# Patient Record
Sex: FEMALE | Race: WHITE
[De-identification: names, ages, dates, MRNs, and addresses within clinical notes are randomized per-mention and may not be internally consistent; named-entity substitution may affect disease eponyms.]

---

## 2018-10-03 ENCOUNTER — HOSPITAL ENCOUNTER (EMERGENCY)
Dept: HOSPITAL 84 - D.ER | Age: 33
Discharge: HOME | End: 2018-10-03
Payer: MEDICAID

## 2018-10-03 VITALS — HEIGHT: 68 IN | WEIGHT: 130.27 LBS | BODY MASS INDEX: 19.74 KG/M2

## 2018-10-03 VITALS — DIASTOLIC BLOOD PRESSURE: 98 MMHG | SYSTOLIC BLOOD PRESSURE: 145 MMHG
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
## 2018-10-03 DIAGNOSIS — F17.200: ICD-10-CM
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
## 2018-10-03 DIAGNOSIS — R60.0: ICD-10-CM

## 2018-10-03 DIAGNOSIS — R51: ICD-10-CM

## 2018-10-03 DIAGNOSIS — S16.1XXA: Primary | ICD-10-CM

## 2018-10-03 DIAGNOSIS — S89.91XA: ICD-10-CM

## 2021-03-18 ENCOUNTER — HOSPITAL ENCOUNTER (OUTPATIENT)
Dept: HOSPITAL 84 - D.LDO | Age: 36
LOS: 1 days | Discharge: HOME | End: 2021-03-19
Attending: OBSTETRICS & GYNECOLOGY
Payer: MEDICAID

## 2021-03-18 VITALS — BODY MASS INDEX: 19.8 KG/M2

## 2021-03-18 DIAGNOSIS — O16.9: Primary | ICD-10-CM

## 2021-03-18 LAB
ALBUMIN SERPL-MCNC: 2.4 G/DL (ref 3.4–5)
ALP SERPL-CCNC: 87 U/L (ref 30–120)
ALT SERPL-CCNC: 22 U/L (ref 10–68)
AMORPHOUS SEDIMENT: (no result) LPF
AMPHETAMINES UR QL SCN: POSITIVE QUAL
ANION GAP SERPL CALC-SCNC: 7.6 MMOL/L (ref 8–16)
BACTERIA #/AREA URNS HPF: (no result) HPF
BARBITURATES UR QL SCN: NEGATIVE QUAL
BASOPHILS NFR BLD AUTO: 0.2 % (ref 0–2)
BENZODIAZ UR QL SCN: NEGATIVE QUAL
BILIRUB DIRECT SERPL-MCNC: 0.09 MG/DL (ref 0–0.3)
BILIRUB INDIRECT SERPL-MCNC: 0.3 MG/DL (ref 0–1)
BILIRUB SERPL-MCNC: 0.39 MG/DL (ref 0.2–1.3)
BILIRUB SERPL-MCNC: NEGATIVE MG/DL
BUN SERPL-MCNC: 9 MG/DL (ref 7–18)
BZE UR QL SCN: NEGATIVE QUAL
CALCIUM SERPL-MCNC: 8.5 MG/DL (ref 8.5–10.1)
CANNABINOIDS UR QL SCN: NEGATIVE QUAL
CHLORIDE SERPL-SCNC: 105 MMOL/L (ref 98–107)
CO2 SERPL-SCNC: 28.9 MMOL/L (ref 21–32)
CREAT SERPL-MCNC: 0.7 MG/DL (ref 0.6–1.3)
EOSINOPHIL NFR BLD: 0.5 % (ref 0–7)
ERYTHROCYTE [DISTWIDTH] IN BLOOD BY AUTOMATED COUNT: 13.2 % (ref 11.5–14.5)
GLOBULIN SER-MCNC: 3.6 G/L
GLUCOSE SERPL-MCNC: 82 MG/DL (ref 74–106)
HCT VFR BLD CALC: 35.9 % (ref 36–48)
HGB BLD-MCNC: 12.1 G/DL (ref 12–16)
IMM GRANULOCYTES NFR BLD: 0.3 % (ref 0–5)
KETONES UR STRIP-MCNC: NEGATIVE MG/DL
LYMPHOCYTES # BLD: 1.79 10X3/UL (ref 1.18–3.74)
LYMPHOCYTES NFR BLD AUTO: 15.9 % (ref 15–50)
MCH RBC QN AUTO: 29.6 PG (ref 26–34)
MCHC RBC AUTO-ENTMCNC: 33.7 G/DL (ref 31–37)
MCV RBC: 87.8 FL (ref 80–100)
MONOCYTES NFR BLD: 5.4 % (ref 2–11)
NEUTROPHILS # BLD: 8.74 10X3/UL (ref 1.56–6.13)
NEUTROPHILS NFR BLD AUTO: 77.7 % (ref 40–80)
NITRITE UR-MCNC: NEGATIVE MG/ML
OPIATES UR QL SCN: NEGATIVE QUAL
OSMOLALITY SERPL CALC.SUM OF ELEC: 273 MOSM/KG (ref 275–300)
PCP UR QL SCN: NEGATIVE QUAL
PH UR STRIP: 5 [PH] (ref 5–8)
PLATELET # BLD: 361 10X3/UL (ref 130–400)
PMV BLD AUTO: 8.8 FL (ref 7.4–10.4)
POTASSIUM SERPL-SCNC: 3.5 MMOL/L (ref 3.5–5.1)
PROT SERPL-MCNC: 6 G/DL (ref 6.4–8.2)
RBC # BLD AUTO: 4.09 10X6/UL (ref 4–5.4)
SODIUM SERPL-SCNC: 138 MMOL/L (ref 136–145)
SQUAMOUS #/AREA URNS HPF: (no result) HPF (ref 0–4)
URATE SERPL-MCNC: 1.6 MG/DL (ref 2.6–7.2)
UROBILINOGEN UR-MCNC: 1 MG/DL (ref ?–2)
WBC # BLD AUTO: 11.3 10X3/UL (ref 4.8–10.8)
WBC #/AREA URNS HPF: (no result) HPF (ref 0–4)

## 2021-03-19 LAB
HCV AB S/CO SERPL IA: <0.1 S/CO RAT (ref 0–0.9)
PROT 24H UR-MRATE: 256 MG/24HR (ref 0–149.1)
PROT UR-MCNC: 23.3 MG/DL (ref 0–11.9)
RUBV IGG SERPL IA-ACNC: <0.9 INDEX

## 2021-03-24 ENCOUNTER — HOSPITAL ENCOUNTER (OUTPATIENT)
Dept: HOSPITAL 84 - D.LDO | Age: 36
Discharge: HOME | End: 2021-03-24
Attending: OBSTETRICS & GYNECOLOGY
Payer: MEDICAID

## 2021-03-24 VITALS — BODY MASS INDEX: 19.8 KG/M2

## 2021-03-24 DIAGNOSIS — O10.919: Primary | ICD-10-CM

## 2021-04-20 ENCOUNTER — HOSPITAL ENCOUNTER (INPATIENT)
Dept: HOSPITAL 84 - D.LD | Age: 36
LOS: 1 days | Discharge: HOME | End: 2021-04-21
Attending: OBSTETRICS & GYNECOLOGY | Admitting: OBSTETRICS & GYNECOLOGY
Payer: MEDICAID

## 2021-04-20 VITALS — HEIGHT: 68 IN | BODY MASS INDEX: 22.58 KG/M2 | WEIGHT: 149 LBS

## 2021-04-20 VITALS — DIASTOLIC BLOOD PRESSURE: 63 MMHG | SYSTOLIC BLOOD PRESSURE: 107 MMHG

## 2021-04-20 VITALS — SYSTOLIC BLOOD PRESSURE: 130 MMHG | DIASTOLIC BLOOD PRESSURE: 79 MMHG

## 2021-04-20 VITALS — DIASTOLIC BLOOD PRESSURE: 85 MMHG | SYSTOLIC BLOOD PRESSURE: 122 MMHG

## 2021-04-20 VITALS — DIASTOLIC BLOOD PRESSURE: 95 MMHG | SYSTOLIC BLOOD PRESSURE: 171 MMHG

## 2021-04-20 VITALS — DIASTOLIC BLOOD PRESSURE: 91 MMHG | SYSTOLIC BLOOD PRESSURE: 143 MMHG

## 2021-04-20 VITALS — SYSTOLIC BLOOD PRESSURE: 134 MMHG | DIASTOLIC BLOOD PRESSURE: 78 MMHG

## 2021-04-20 VITALS — DIASTOLIC BLOOD PRESSURE: 68 MMHG | SYSTOLIC BLOOD PRESSURE: 117 MMHG

## 2021-04-20 DIAGNOSIS — A59.9: ICD-10-CM

## 2021-04-20 DIAGNOSIS — Z91.19: ICD-10-CM

## 2021-04-20 DIAGNOSIS — F15.10: ICD-10-CM

## 2021-04-20 DIAGNOSIS — F32.9: ICD-10-CM

## 2021-04-20 DIAGNOSIS — Z3A.37: ICD-10-CM

## 2021-04-20 LAB
AMPHETAMINES UR QL SCN: POSITIVE QUAL
BARBITURATES UR QL SCN: NEGATIVE QUAL
BENZODIAZ UR QL SCN: NEGATIVE QUAL
BZE UR QL SCN: NEGATIVE QUAL
CANNABINOIDS UR QL SCN: NEGATIVE QUAL
ERYTHROCYTE [DISTWIDTH] IN BLOOD BY AUTOMATED COUNT: 14.3 % (ref 11.5–14.5)
HCT VFR BLD CALC: 33.1 % (ref 36–48)
HGB BLD-MCNC: 10.7 G/DL (ref 12–16)
MCH RBC QN AUTO: 28 PG (ref 26–34)
MCHC RBC AUTO-ENTMCNC: 32.3 G/DL (ref 31–37)
MCV RBC: 86.6 FL (ref 80–100)
OPIATES UR QL SCN: NEGATIVE QUAL
PCP UR QL SCN: NEGATIVE QUAL
PLATELET # BLD: 356 10X3/UL (ref 130–400)
PMV BLD AUTO: 9.4 FL (ref 7.4–10.4)
RBC # BLD AUTO: 3.82 10X6/UL (ref 4–5.4)
WBC # BLD AUTO: 8.9 10X3/UL (ref 4.8–10.8)

## 2021-04-20 PROCEDURE — 0HQ9XZZ REPAIR PERINEUM SKIN, EXTERNAL APPROACH: ICD-10-PCS | Performed by: OBSTETRICS & GYNECOLOGY

## 2021-04-20 PROCEDURE — 10907ZC DRAINAGE OF AMNIOTIC FLUID, THERAPEUTIC FROM PRODUCTS OF CONCEPTION, VIA NATURAL OR ARTIFICIAL OPENING: ICD-10-PCS | Performed by: OBSTETRICS & GYNECOLOGY

## 2021-04-20 PROCEDURE — 3E033VJ INTRODUCTION OF OTHER HORMONE INTO PERIPHERAL VEIN, PERCUTANEOUS APPROACH: ICD-10-PCS | Performed by: OBSTETRICS & GYNECOLOGY

## 2021-04-20 NOTE — NUR
PT A,A,OX4. BLOOD PRESSURE ELEVATED. MD NOTIFIED. ORDERS RECEIVED TO RESTART
LABETOLOL. FF,MIDLINE U1. SMALL AMOUNT RUBRA LOCHIA NOTED. PERIPADS AND ICE
PACK PROVIDED. PT UP TO BR SECOND TIME. VOIDS WITHOUT DIFFICULTY. PT DENIES
NEEDS AT THIS TIME. CALL LIGHT WITHIN REACH.

## 2021-04-20 NOTE — NUR
PT REPORTS THAT SHE IS SUPPOSED TO TAKE LABETOLOL TID, HOWEVER DOES NOT KNOW
WHEN SHE LAST TOOK MEDS.

## 2021-04-20 NOTE — NUR
PT UPSET AND STATES "SHE DID NOT SHOOT UP ON METH THIS MORNING AND THE BABY'S
DOCTOR
SAID SHE HAD TOLD A NURSE THAT SHE SAID THAT THIS MORNING." EXPLAINED TO PT
THAT POSITIVE DRUG SCREENING DURING ANY TIME OF PREGNANCY FLAGS TO DRUG SCREEN
BABY. EXPLAINED TO PATIENT THAT CASE
MANAGEMENT IS NOTIFIED PER [employer] AND [employer] WILL TALK WITH HER. PT STATES SHE
UNDERSTANDS.

## 2021-04-21 VITALS — DIASTOLIC BLOOD PRESSURE: 80 MMHG | SYSTOLIC BLOOD PRESSURE: 149 MMHG

## 2021-04-21 VITALS — DIASTOLIC BLOOD PRESSURE: 80 MMHG | SYSTOLIC BLOOD PRESSURE: 138 MMHG

## 2021-04-21 VITALS — SYSTOLIC BLOOD PRESSURE: 144 MMHG | DIASTOLIC BLOOD PRESSURE: 86 MMHG

## 2021-04-21 VITALS — SYSTOLIC BLOOD PRESSURE: 141 MMHG | DIASTOLIC BLOOD PRESSURE: 82 MMHG

## 2021-04-21 NOTE — NUR
LARGE CUP OF ICE AS REQESTED, CONTNUE TO RATE PAIN AT 0/10.  ADDITIONAL RODY
PADS AND MESH BRIEFS PLACED IN BATHROOM. CALL LIGHT IS WITH IN HER REACH AND
INFANT IN CRIB AT BEDSIDE.

## 2021-04-21 NOTE — NUR
MMR GIVEN PER ORDERS. SEE EMAR FOR ADMINISTRATION. D/C INSTRUCTIONS EXPLAINED,
SIGND, AND WITNESSED. PT WILL BE ROOMING IN DUE TO INFANT NOT D/C'D AT THIS
TIME.

## 2021-04-21 NOTE — NUR
TO ROOM TO GO OVER DISCHARGE/ROOMING IN INSTRUCTIONS.  INFANT TO BREAST AT
THIS TIME.  PT WILL CALL WHEN SHE IS FINISHED FEEDING.

## 2021-04-21 NOTE — NUR
AM ASSESSMENT COMPLETED AND CHARTED TO FLOWSHEET.  FUNDUS FIRM AT U/U WITH
LIGHT BLEEDING NOTED TO RODY PAD,  SHE DENIES CLOTS WITH VOIDS. RATES PAIN AT
0/10 AT THIS TIME. INFANT IN CRIB AT BEDSIDE. PT DENIES NEEDS AT THIS TIME.
CALL LIGHT IS WITH IN HER REACH, SIDE RAILS UP X 2.

## 2021-04-21 NOTE — NUR
SALINE LOCK REMOVED WITH CATH INTACT.  PT RATES PAIN AT 0/10. LARGE CUP OF ICE
AS REQUESTED.  PT PROVIDED WITH ROOMING IN PAPERWORK TO READ OVER. INFANT IN
CRIB AT BEDSIDE, CALL LIGHT IN REACH.